# Patient Record
Sex: FEMALE | Race: OTHER | NOT HISPANIC OR LATINO | URBAN - METROPOLITAN AREA
[De-identification: names, ages, dates, MRNs, and addresses within clinical notes are randomized per-mention and may not be internally consistent; named-entity substitution may affect disease eponyms.]

---

## 2022-02-05 ENCOUNTER — EMERGENCY (EMERGENCY)
Facility: HOSPITAL | Age: 52
LOS: 0 days | Discharge: HOME | End: 2022-02-06
Attending: STUDENT IN AN ORGANIZED HEALTH CARE EDUCATION/TRAINING PROGRAM | Admitting: STUDENT IN AN ORGANIZED HEALTH CARE EDUCATION/TRAINING PROGRAM
Payer: MEDICAID

## 2022-02-05 VITALS
SYSTOLIC BLOOD PRESSURE: 113 MMHG | TEMPERATURE: 98 F | HEART RATE: 86 BPM | OXYGEN SATURATION: 99 % | DIASTOLIC BLOOD PRESSURE: 69 MMHG | RESPIRATION RATE: 16 BRPM

## 2022-02-05 DIAGNOSIS — R42 DIZZINESS AND GIDDINESS: ICD-10-CM

## 2022-02-05 DIAGNOSIS — R09.81 NASAL CONGESTION: ICD-10-CM

## 2022-02-05 DIAGNOSIS — J32.9 CHRONIC SINUSITIS, UNSPECIFIED: ICD-10-CM

## 2022-02-05 LAB
ALBUMIN SERPL ELPH-MCNC: 4 G/DL — SIGNIFICANT CHANGE UP (ref 3.5–5.2)
ALP SERPL-CCNC: 72 U/L — SIGNIFICANT CHANGE UP (ref 30–115)
ALT FLD-CCNC: 25 U/L — SIGNIFICANT CHANGE UP (ref 0–41)
ANION GAP SERPL CALC-SCNC: 10 MMOL/L — SIGNIFICANT CHANGE UP (ref 7–14)
AST SERPL-CCNC: 21 U/L — SIGNIFICANT CHANGE UP (ref 0–41)
BILIRUB SERPL-MCNC: 0.5 MG/DL — SIGNIFICANT CHANGE UP (ref 0.2–1.2)
BUN SERPL-MCNC: 17 MG/DL — SIGNIFICANT CHANGE UP (ref 10–20)
CALCIUM SERPL-MCNC: 9.4 MG/DL — SIGNIFICANT CHANGE UP (ref 8.5–10.1)
CHLORIDE SERPL-SCNC: 104 MMOL/L — SIGNIFICANT CHANGE UP (ref 98–110)
CO2 SERPL-SCNC: 26 MMOL/L — SIGNIFICANT CHANGE UP (ref 17–32)
CREAT SERPL-MCNC: 0.9 MG/DL — SIGNIFICANT CHANGE UP (ref 0.7–1.5)
GLUCOSE SERPL-MCNC: 101 MG/DL — HIGH (ref 70–99)
HCG SERPL QL: NEGATIVE — SIGNIFICANT CHANGE UP
HCT VFR BLD CALC: 39.3 % — SIGNIFICANT CHANGE UP (ref 37–47)
HGB BLD-MCNC: 13.3 G/DL — SIGNIFICANT CHANGE UP (ref 12–16)
MCHC RBC-ENTMCNC: 30.4 PG — SIGNIFICANT CHANGE UP (ref 27–31)
MCHC RBC-ENTMCNC: 33.8 G/DL — SIGNIFICANT CHANGE UP (ref 32–37)
MCV RBC AUTO: 89.9 FL — SIGNIFICANT CHANGE UP (ref 81–99)
NRBC # BLD: 0 /100 WBCS — SIGNIFICANT CHANGE UP (ref 0–0)
PLATELET # BLD AUTO: 277 K/UL — SIGNIFICANT CHANGE UP (ref 130–400)
POTASSIUM SERPL-MCNC: 4.2 MMOL/L — SIGNIFICANT CHANGE UP (ref 3.5–5)
POTASSIUM SERPL-SCNC: 4.2 MMOL/L — SIGNIFICANT CHANGE UP (ref 3.5–5)
PROT SERPL-MCNC: 6.1 G/DL — SIGNIFICANT CHANGE UP (ref 6–8)
RBC # BLD: 4.37 M/UL — SIGNIFICANT CHANGE UP (ref 4.2–5.4)
RBC # FLD: 12.8 % — SIGNIFICANT CHANGE UP (ref 11.5–14.5)
SODIUM SERPL-SCNC: 140 MMOL/L — SIGNIFICANT CHANGE UP (ref 135–146)
TROPONIN T SERPL-MCNC: <0.01 NG/ML — SIGNIFICANT CHANGE UP
WBC # BLD: 10.25 K/UL — SIGNIFICANT CHANGE UP (ref 4.8–10.8)
WBC # FLD AUTO: 10.25 K/UL — SIGNIFICANT CHANGE UP (ref 4.8–10.8)

## 2022-02-05 PROCEDURE — 70487 CT MAXILLOFACIAL W/DYE: CPT | Mod: 26,MG

## 2022-02-05 PROCEDURE — 70450 CT HEAD/BRAIN W/O DYE: CPT | Mod: 26,MA

## 2022-02-05 PROCEDURE — 99285 EMERGENCY DEPT VISIT HI MDM: CPT

## 2022-02-05 PROCEDURE — G1004: CPT

## 2022-02-05 PROCEDURE — 93010 ELECTROCARDIOGRAM REPORT: CPT

## 2022-02-05 RX ORDER — ACETAMINOPHEN 500 MG
650 TABLET ORAL ONCE
Refills: 0 | Status: COMPLETED | OUTPATIENT
Start: 2022-02-05 | End: 2022-02-05

## 2022-02-05 RX ADMIN — Medication 650 MILLIGRAM(S): at 21:57

## 2022-02-05 NOTE — ED PROVIDER NOTE - CARE PROVIDER_API CALL
Daniel Gonzales)  Otolaryngology  [address] [place] [address], [address] [place] [address]  Phone: [phone]  Fax: [phone]  Follow Up Time: 1-3 Days

## 2022-02-05 NOTE — ED PROVIDER NOTE - PHYSICAL EXAMINATION
Physical Exam    Vital Signs: I have reviewed the initial vital signs.  Constitutional: well-nourished, appears stated age, no acute distress  Eyes: Conjunctiva pink, Sclera clear, PERRLA, EOMI.  ENT: OP is clear without exudates, normal dentition, normal gingival, tongue without swelling, TMs clear b/l, + nasal congestion right sided, no lymphadenopathy, + right maxillary sinus ttp  Cardiovascular: S1 and S2, regular rate, regular rhythm, well-perfused extremities, radial pulses equal and 2+  Respiratory: unlabored respiratory effort, clear to auscultation bilaterally no wheezing, rales and rhonchi  Gastrointestinal: soft, non-tender abdomen, no pulsatile mass, normal bowl sounds  Musculoskeletal: supple neck, no lower extremity edema, no midline tenderness  Integumentary: warm, dry, no rash  Neurologic: awake, alert, nvi

## 2022-02-05 NOTE — ED ADULT TRIAGE NOTE - CHIEF COMPLAINT QUOTE
Biba due to swelling to R side of face starting this morning and dizziness 30 mins prior. NIH-0.  in triage. No SOB, nor swelling to lips nor tongue.

## 2022-02-05 NOTE — ED PROVIDER NOTE - OBJECTIVE STATEMENT
51-year-old female, no past medical history, presents to ED for right sinus pain. Noted last several days, associated with swelling, mild, aching, no radiation. Associated with nasal congestion described as pressure. Denies fever, chills, chest pain, shortness of breath.

## 2022-02-05 NOTE — ED PROVIDER NOTE - CLINICAL SUMMARY MEDICAL DECISION MAKING FREE TEXT BOX
I personally evaluated the patient. I reviewed the Resident´s or Physician Assistant´s note (as assigned above), and agree with the findings and plan except as documented in my note.  Patient evaluated for right facial swelling.  Labs, CT head, CT maxillofacial performed in the ED.  Patient noted to have cellulitis on CT, given prescription for antibiotics.  Airway intact, patient well-appearing, no significant facial swelling or airway obstruction.  Instructed to follow-up with ENT and PMD.  I have fully discussed the medical management and delivery of care with the patient. I have discussed any available labs, imaging and treatment options with the patient. Patient confirms understanding and has been given detailed return precautions. Patient instructed to return to the ED should symptoms persist or worsen. Patient has demonstrated capacity and has verbalized understanding. Patient is well appearing upon discharge.

## 2022-02-05 NOTE — ED PROVIDER NOTE - PATIENT PORTAL LINK FT
You can access the FollowMyHealth Patient Portal offered by [facility] by registering at the following website: [email or web address]. By joining [facility]’s FollowMyHealth portal, you will also be able to view your health information using other applications (apps) compatible with our system.

## 2022-02-05 NOTE — ED PROVIDER NOTE - ATTENDING CONTRIBUTION TO CARE
51-year-old female no past medical history presents with right cheek swelling.  Patient complaining of right cheek pain and swelling since earlier today.  Associated with mild nasal congestion.  No fever/chills, no vision changes, no exophthalmos, no pain with extraocular movements, no ear pain, no posterior auricular pain, no neck stiffness, no chest pain, shortness of breath.    CONSTITUTIONAL: Well-developed; well-nourished; in no acute distress. Sitting up and providing appropriate history and physical examination  SKIN: skin exam is warm and dry, no acute rash.  HEAD: Normocephalic; atraumatic.  EYES: PERRL, 3 mm bilateral, no nystagmus, EOM intact; conjunctiva and sclera clear.  No pain with extraocular movements.  ENT: + TTP over right cheek with mild facial swelling.  No nasal discharge; airway clear.  NECK: Supple; non tender. + full passive ROM in all directions. No JVD  CARD: S1, S2 normal; no murmurs, gallops, or rubs. Regular rate and rhythm. + Symmetric Strong Pulses  RESP: No wheezes, rales or rhonchi. Good air movement bilaterally  ABD: soft; non-distended; non-tender. No Rebound, No Guarding, No signs of peritonitis, No CVA tenderness. No pulsatile abdominal mass. + Strong and Symmetric Pulses  EXT: Normal ROM. No clubbing, cyanosis or edema. Dp and Pt Pulses intact. Cap refill less than 3 seconds  NEURO: CN 2-12 intact, normal finger to nose, normal romberg, stable gait, no sensory or motor deficits, Alert, oriented, grossly unremarkable. No Focal deficits. GCS 15. NIH 0  PSYCH: Cooperative, appropriate.

## 2022-02-05 NOTE — ED PROVIDER NOTE - NS ED ROS FT
Constitutional: (-) fever, (-) chills  Eyes: (-) visual changes  ENT: (+) nasal congestions  Cardiovascular: (-) chest pain, (-) syncope  Respiratory: (-) cough, (-) shortness of breath, (-) dyspnea,   Gastrointestinal: (-) vomiting, (-) diarrhea, (-)nausea,  Musculoskeletal: (-) neck pain, (-) back pain, (-) joint pain,  Integumentary: (-) rash, (-) edema, (-) bruises  Neurological: (-) headache, (-) loc, (-) dizziness, (-) tingling, (-)numbness  Peripheral Vascular: (-) leg swelling  :  (-)dysuria,  (-) hematuria  Allergic/Immunologic: (-) pruritus
